# Patient Record
Sex: FEMALE | Race: BLACK OR AFRICAN AMERICAN | URBAN - METROPOLITAN AREA
[De-identification: names, ages, dates, MRNs, and addresses within clinical notes are randomized per-mention and may not be internally consistent; named-entity substitution may affect disease eponyms.]

---

## 2017-12-22 ENCOUNTER — HISTORICAL (OUTPATIENT)
Dept: EMERGENCY MEDICINE | Facility: HOSPITAL | Age: 27
End: 2017-12-22

## 2019-02-19 ENCOUNTER — HISTORICAL (OUTPATIENT)
Dept: LAB | Facility: HOSPITAL | Age: 29
End: 2019-02-19

## 2022-04-07 ENCOUNTER — HISTORICAL (OUTPATIENT)
Dept: ADMINISTRATIVE | Facility: HOSPITAL | Age: 32
End: 2022-04-07

## 2022-04-24 VITALS
WEIGHT: 293 LBS | HEIGHT: 66 IN | DIASTOLIC BLOOD PRESSURE: 83 MMHG | SYSTOLIC BLOOD PRESSURE: 128 MMHG | BODY MASS INDEX: 47.09 KG/M2

## 2022-04-29 NOTE — ED PROVIDER NOTES
Patient:   Mk Corley             MRN: [id number]            FIN: [id number]               Age:   27 years     Sex:  Female     :  1990   Associated Diagnoses:   Tooth ache   Author:   Ariel Pelaez MD      Basic Information   Time seen: Date & time 2017 08:50:00.   History source: Patient.   Arrival mode: Private vehicle.   History limitation: None.   Additional information: Chief Complaint from Nursing Triage Note : Chief Complaint   2017 8:58 CST      Chief Complaint           TOOTH PAIN UPPER LEFT WITH EAR PAIN  .      History of Present Illness   The patient presents with dental pain.  The onset was just prior to arrival.  The course/duration of symptoms is constant.  Type of injury: none.  The location where the incident occurred was at home.  Location: Left upper. The character of symptoms is pain and swelling.  The degree of pain is minimal.  The exacerbating factor is eating.  The relieving factor is none.  Risk factors consist of none.  Prior episodes: occasional.  Therapy today: none.  Associated symptoms: none.        Review of Systems   ENMT symptoms:  Mouth: Dental pain.             Additional review of systems information: All other systems reviewed and otherwise negative.      Health Status   Allergies:    Allergic Reactions (Selected)  No Known Allergies,    Allergies (1) Active Reaction  No Known Allergies None Documented  .      Past Medical/ Family/ Social History   Surgical history:    No active procedure history items have been selected or recorded..   Family history:    No family history items have been selected or recorded..   Social history: Not significant.      Physical Examination               Vital Signs   Vital Signs   2017 8:58 CST      Temperature Oral          37.2 DegC                             Temperature Oral (calculated)             98.96 DegF                             Peripheral Pulse Rate     95 bpm                              Respiratory Rate          14 br/min                             SpO2                      98 %                             Oxygen Therapy            Room air                             Systolic Blood Pressure   131 mmHg                             Diastolic Blood Pressure  85 mmHg  .   Measurements   12/22/2017 8:58 CST      Weight Dosing             157.4 kg                             Weight Measured           157.4 kg                             Weight Measured and Calculated in Lbs     347.00 lb                             Height/Length Dosing      167 cm                             Height/Length Measured    167 cm                             Body Mass Index Measured  56.44 kg/m2  .   Basic Oxygen Information   12/22/2017 8:58 CST      SpO2                      98 %                             Oxygen Therapy            Room air  .   General:  Alert, no acute distress.    Skin:  Warm, pink, intact, no rash.    Head:  Normocephalic, atraumatic.    Neck:  Supple, trachea midline, no tenderness, no JVD.    Eye:  Pupils are equal, round and reactive to light, extraocular movements are intact, normal conjunctiva.    Ears, nose, mouth and throat:  Tympanic membranes clear, oral mucosa moist, no pharyngeal erythema or exudate, Tooth: Left, upper, premolar, dental caries.    Cardiovascular:  Regular rate and rhythm, No murmur, Normal peripheral perfusion, No edema.    Respiratory:  Lungs are clear to auscultation, respirations are non-labored, breath sounds are equal, Symmetrical chest wall expansion.    Musculoskeletal:  Normal ROM, normal strength, no tenderness, no swelling, no deformity.    Chest wall   Gastrointestinal:  Soft, Nontender, Non distended, Normal bowel sounds.    Neurological:  Alert and oriented to person, place, time, and situation, No focal neurological deficit observed, CN II-XII intact.    Lymphatics   Psychiatric:  Cooperative, appropriate mood & affect.       Impression and Plan   Diagnosis    Tooth ache (ABK21-QS K08.89)   Plan   Condition: Improved, Stable.    Disposition: Medically cleared, Discharged: Time  12/22/2017 09:17:00, to home.    Prescriptions: Launch prescriptions   Pharmacy:  Toradol 10 mg oral tablet (Prescribe): 10 mg = 1 tab(s), Oral, QID, PRN PRN as needed for pain, not to exceed 40 mg/day and 5 days duration for all dose forms, # 12 tab(s), 0 Refill(s), Pharmacy: [facility] [id number]  penicillin V potassium 500 mg oral tablet (Prescribe): 500 mg = 1 tab(s), Oral, QID, X 10 day(s), # 40 tab(s), 0 Refill(s), Pharmacy: [facility] [id number].    Patient was given the following educational materials: Dental Pain, Dental Pain.    Follow up with: Clinic PCP; Follow up with primary care provider Call for followup appointment, In: as needed.    Counseled: Patient, Regarding diagnosis, Regarding diagnostic results, Regarding treatment plan, Regarding prescription, Patient indicated understanding of instructions.

## 2024-07-01 ENCOUNTER — OFFICE VISIT (OUTPATIENT)
Dept: INTERNAL MEDICINE | Facility: CLINIC | Age: 34
End: 2024-07-01
Payer: MEDICARE

## 2024-07-01 VITALS
TEMPERATURE: 98 F | DIASTOLIC BLOOD PRESSURE: 83 MMHG | WEIGHT: 293 LBS | BODY MASS INDEX: 47.09 KG/M2 | RESPIRATION RATE: 20 BRPM | HEIGHT: 66 IN | HEART RATE: 109 BPM | OXYGEN SATURATION: 99 % | SYSTOLIC BLOOD PRESSURE: 125 MMHG

## 2024-07-01 DIAGNOSIS — J45.909 ASTHMA, UNSPECIFIED ASTHMA SEVERITY, UNSPECIFIED WHETHER COMPLICATED, UNSPECIFIED WHETHER PERSISTENT: ICD-10-CM

## 2024-07-01 DIAGNOSIS — E66.01 CLASS 3 SEVERE OBESITY WITH BODY MASS INDEX (BMI) OF 50.0 TO 59.9 IN ADULT, UNSPECIFIED OBESITY TYPE, UNSPECIFIED WHETHER SERIOUS COMORBIDITY PRESENT: ICD-10-CM

## 2024-07-01 DIAGNOSIS — Q72.61: ICD-10-CM

## 2024-07-01 DIAGNOSIS — Q72.51: ICD-10-CM

## 2024-07-01 DIAGNOSIS — F41.1 GENERALIZED ANXIETY DISORDER: ICD-10-CM

## 2024-07-01 DIAGNOSIS — Z11.3 SCREEN FOR STD (SEXUALLY TRANSMITTED DISEASE): ICD-10-CM

## 2024-07-01 DIAGNOSIS — Z00.00 WELLNESS EXAMINATION: ICD-10-CM

## 2024-07-01 PROCEDURE — 99214 OFFICE O/P EST MOD 30 MIN: CPT | Mod: PBBFAC | Performed by: NURSE PRACTITIONER

## 2024-07-01 PROCEDURE — 99204 OFFICE O/P NEW MOD 45 MIN: CPT | Mod: S$PBB,,, | Performed by: NURSE PRACTITIONER

## 2024-07-01 PROCEDURE — 3074F SYST BP LT 130 MM HG: CPT | Mod: CPTII,,, | Performed by: NURSE PRACTITIONER

## 2024-07-01 PROCEDURE — 1159F MED LIST DOCD IN RCRD: CPT | Mod: CPTII,,, | Performed by: NURSE PRACTITIONER

## 2024-07-01 PROCEDURE — 3008F BODY MASS INDEX DOCD: CPT | Mod: CPTII,,, | Performed by: NURSE PRACTITIONER

## 2024-07-01 PROCEDURE — 1160F RVW MEDS BY RX/DR IN RCRD: CPT | Mod: CPTII,,, | Performed by: NURSE PRACTITIONER

## 2024-07-01 PROCEDURE — 3079F DIAST BP 80-89 MM HG: CPT | Mod: CPTII,,, | Performed by: NURSE PRACTITIONER

## 2024-07-01 NOTE — PROGRESS NOTES
"[facility]  [address]     Patient Name: Kirby Nycollette Paige   : 1990  MRN:[id number]     Chief Complaint     Chief Complaint   Patient presents with    [facility]        History of Present Illness     33 year old AAF, presents in clinic to establish PCP. Complains of fatigue, abd pain after eating and relieved with gas. Request for RX for a new R leg prosthesis. States the one she is using today is 4 years old.   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 Following Dr. Sorenson G[facility], due for routine pap smear. Denies abnormal paps.   LMP 2024-2024, cycles are irregular, and heavy and painful. Denies recent asthma exacerbations.  Denies chest pain, shortness of breath, cough, fever, headache, dizziness, weakness, abdominal pain, nausea, vomiting, diarrhea, constipation, dysuria, depression, anxiety.   Past PCP Dr. Humza Gruber in [facility]([place]          Review of Systems     Review of Systems   Constitutional:  Positive for fatigue.   HENT: Negative.     Eyes: Negative.    Respiratory: Negative.     Cardiovascular: Negative.    Gastrointestinal:  Positive for abdominal pain and reflux.   Endocrine: Negative.    Genitourinary: Negative.    Musculoskeletal: Negative.    Integumentary:  Negative.   Allergic/Immunologic: Negative.    Neurological: Negative.    Hematological: Negative.    Psychiatric/Behavioral: Negative.     All other systems reviewed and are negative.       Physical Examination     Visit Vitals  /83 (BP Location: Right forearm, Patient Position: Sitting, BP Method: Medium (Automatic))   Pulse 109   Temp 98 °F (36.7 °C) (Oral)   Resp 20   Ht 5' 6" (1.676 m)   Wt (!) 167.8 kg (370 lb)   LMP 2024 (Approximate)   SpO2 99%   BMI 59.72 kg/m²        BP Readings from Last 6 Encounters:   24 125/83   19 128/83   ]    Wt Readings from Last 6 Encounters:   24 (!) 167.8 " "kg (370 lb)   02/18/19 (!) 150.6 kg (331 lb 15.9 oz)   ]    BMI Readings from Last 3 Encounters:   07/01/24 59.72 kg/m²   02/18/19 53.35 kg/m²         Physical Exam  Vitals and nursing note reviewed.   Constitutional:       Appearance: Normal appearance. She is obese.   HENT:      Head: Normocephalic and atraumatic.      Right Ear: Tympanic membrane, ear canal and external ear normal.      Left Ear: Tympanic membrane, ear canal and external ear normal.      Nose: Nose normal.      Mouth/Throat:      Mouth: Mucous membranes are moist.      Pharynx: Oropharynx is clear.   Eyes:      Extraocular Movements: Extraocular movements intact.      Conjunctiva/sclera: Conjunctivae normal.      Pupils: Pupils are equal, round, and reactive to light.   Cardiovascular:      Rate and Rhythm: Normal rate and regular rhythm.      Pulses: Normal pulses.      Heart sounds: Normal heart sounds.   Pulmonary:      Effort: Pulmonary effort is normal.      Breath sounds: Normal breath sounds.   Abdominal:      General: Abdomen is flat. Bowel sounds are normal.      Palpations: Abdomen is soft.   Musculoskeletal:         General: Normal range of motion.      Cervical back: Normal range of motion and neck supple.      Comments: Right leg prosthesis   Skin:     General: Skin is warm and dry.      Capillary Refill: Capillary refill takes less than 2 seconds.   Neurological:      General: No focal deficit present.      Mental Status: She is alert and oriented to person, place, and time. Mental status is at baseline.   Psychiatric:         Mood and Affect: Mood normal.         Behavior: Behavior normal.         Thought Content: Thought content normal.         Judgment: Judgment normal.          Labs / Imaging     Chemistry:  No results found for: "NA", "K", "CHLORIDE", "BUN", "CREATININE", "EGFRNORACEVR", "GLUCOSE", "CALCIUM", "ALKPHOS", "LABPROT", "ALBUMIN", "BILIDIR", "IBILI", "AST", "ALT", "MG", "PHOS", "[id number]"     No results found " "for: "HGBA1C", "MICROALBCREA"     Hematology:  No results found for: "WBC", "RBC", "HGB", "HCT", "MCV", "MCH", "MCHC", "RDW", "PLT", "MPV", "GRAN", "LYMPH", "MONO", "EOS", "BASO", "EOSINOPHIL", "BASOPHIL"     Lipid Panel:  No results found for: "CHOL", "HDL", "LDL", "TRIG", "TOTALCHOLEST"     Urine:  No results found for: "COLORUA", "APPEARANCEUA", "SGUA", "PHUA", "PROTEINUA", "GLUCOSEUA", "KETONESUA", "BLOODUA", "NITRITESUA", "LEUKOCYTESUR", "RBCUA", "WBCUA", "BACTERIA", "SQEPUA", "HYALINECASTS", "CREATRANDUR", "PROTEINURINE", "UPROTCREA"       Assessment       ICD-10-CM ICD-9-CM   1. Congenital absence of lower leg only, right  Q72.51 755.35    Q72.61    2. Asthma, unspecified asthma severity, unspecified whether complicated, unspecified whether persistent  J45.909 493.90   3. Anxiety disorder, unspecified  F41.9 300.00   4. Wellness examination  Z00.00 V70.0   5. Screen for STD (sexually transmitted disease)  Z11.3 V74.5   6. Obesity, unspecified  E66.9 278.00        Plan     1. Congenital absence of lower leg only, right  RX for new right leg prosthesis, will send to [facility]]  - PROSTHESIS FOR HOME USE    2. Asthma, unspecified asthma severity, unspecified whether complicated, unspecified whether persistent  Last PFT- /Last CXR- ordered  Use inhalers as prescribed (rinse mouth after use of steroid inhalers).    Use long term inhalers daily and rescue inhaler as needed.    Avoid triggers (high humidity, strong odors, chemical fumes).    Report signs of upper respiratory infection as soon as possible for early treatment.    Practice/encouraged abdominal breathing.   Eat smaller, more frequent meals.   Flu shot recommended yearly.    Smoking cessation encouraged   - CBC Auto Differential; Future  - Comprehensive Metabolic Panel; Future  - X-Ray Chest PA And Lateral; Future  - Complete PFT with bronchodilator; Future    3. Anxiety disorder, unspecified     Practice deep breathing or abdominal breathing " exercises when anxiety occurs.  Exercise daily. Get sunlight daily.  Avoid caffeine, alcohol and stimulants.  Practice positive phrases and repeat throughout the day, yoga, lavender scents or Chamomile tea will help anxiety.  Set healthy boundaries, avoid people and conversations that increase stress.  Reports any symptoms of suicidal or homicidal ideations immediately, if clinic is closed go to nearest emergency room.   - TSH; Future    4. Wellness examination    - CBC Auto Differential; Future  - Comprehensive Metabolic Panel; Future  - Lipid Panel; Future  - TSH; Future  - Urinalysis; Future  - SYPHILIS ANTIBODY (WITH REFLEX RPR); Future  - HIV 1/2 Ag/Ab (4th Gen); Future  - Hepatitis Panel, Acute; Future  - Chlamydia/GC, PCR; Future  - HCG Qualitative Urine; Future    5. Screen for STD (sexually transmitted disease)    - Urinalysis; Future  - SYPHILIS ANTIBODY (WITH REFLEX RPR); Future  - HIV 1/2 Ag/Ab (4th Gen); Future  - Hepatitis Panel, Acute; Future  - Chlamydia/GC, PCR; Future  - HCG Qualitative Urine; Future    6. Obesity, unspecified  Goal BMI <30.  Exercise 5 times a week for 30 minutes per day.  Avoid soda, simple sugars, excessive rice, potatoes or bread. Limit fast foods and fried foods.  Choose complex carbs in moderation (example: green vegetables, beans, oatmeal). Eat plenty of fresh fruits and vegetables with lean meats daily.  Do not skip meals. Eat a balanced portion size.  Avoid fad diets. Consider permanent healthy life style changes.     - Lipid Panel; Future  - TSH; Future        Orders Placed This Encounter   Procedures    PROSTHESIS FOR HOME USE    Chlamydia/GC, PCR    X-Ray Chest PA And Lateral    CBC Auto Differential    Comprehensive Metabolic Panel    Lipid Panel    TSH    Urinalysis    SYPHILIS ANTIBODY (WITH REFLEX RPR)    HIV 1/2 Ag/Ab (4th Gen)    Hepatitis Panel, Acute    HCG Qualitative Urine    Complete PFT with bronchodilator         Future Appointments   Date Time Provider  Department Center   7/18/2024 11:15 AM [facility] XR1 [facility]    7/18/2024 12:00 PM [facility]   8/20/2024  9:40 AM [facility]        Follow up in about 4 weeks (around 7/29/2024) for lab review.    Labs thoroughly reviewed with patient. Medication refills addressed today.  RTC prn and 4 wks, with labs 1 week prior to the apt.  COVID 19 precautions given to patient.  Patient voices understanding of all discharge instructions.      [address]

## 2024-07-04 ENCOUNTER — PATIENT MESSAGE (OUTPATIENT)
Dept: INTERNAL MEDICINE | Facility: CLINIC | Age: 34
End: 2024-07-04
Payer: MEDICARE

## 2024-07-07 ENCOUNTER — TELEPHONE (OUTPATIENT)
Dept: INTERNAL MEDICINE | Facility: CLINIC | Age: 34
End: 2024-07-07
Payer: MEDICARE

## 2024-07-08 NOTE — TELEPHONE ENCOUNTER
Please fax printed order for right leg prosthesis to [facility] in [place] ([place]). Contact the [facility] to verify if any other information needs to be completed in order for pt to be fitted and  medical supplies. Thanks

## 2024-08-07 ENCOUNTER — PATIENT MESSAGE (OUTPATIENT)
Dept: INTERNAL MEDICINE | Facility: CLINIC | Age: 34
End: 2024-08-07
Payer: MEDICARE

## 2024-08-19 ENCOUNTER — TELEPHONE (OUTPATIENT)
Dept: INTERNAL MEDICINE | Facility: CLINIC | Age: 34
End: 2024-08-19
Payer: MEDICARE

## 2025-07-25 ENCOUNTER — TELEPHONE (OUTPATIENT)
Dept: INTERNAL MEDICINE | Facility: CLINIC | Age: 35
End: 2025-07-25
Payer: MEDICARE

## 2025-07-25 NOTE — TELEPHONE ENCOUNTER
Pt has upcoming appt for lab review . Noted no lab orders placed . Please place lab orders to complete prior to appt on 9/29/25.

## 2025-07-25 NOTE — TELEPHONE ENCOUNTER
----- Message from Salomón sent at 7/24/2025  2:02 PM CDT -----  Regarding: WELLNESS LABS  Hello,  Patient's visit on 09/29/2025 has been changed to an AWV. Her LOV was 07/01/2024. Please order wellness labs.    Thank you.  Uma

## 2025-07-28 ENCOUNTER — TELEPHONE (OUTPATIENT)
Dept: INTERNAL MEDICINE | Facility: CLINIC | Age: 35
End: 2025-07-28
Payer: MEDICARE

## 2025-07-28 DIAGNOSIS — E66.813 CLASS 3 SEVERE OBESITY WITH BODY MASS INDEX (BMI) OF 50.0 TO 59.9 IN ADULT, UNSPECIFIED OBESITY TYPE, UNSPECIFIED WHETHER SERIOUS COMORBIDITY PRESENT: ICD-10-CM

## 2025-07-28 DIAGNOSIS — Z00.00 WELLNESS EXAMINATION: Primary | ICD-10-CM

## 2025-07-28 DIAGNOSIS — Z11.3 SCREEN FOR STD (SEXUALLY TRANSMITTED DISEASE): ICD-10-CM

## 2025-07-28 NOTE — TELEPHONE ENCOUNTER
----- Message from Nurse Jolly sent at 7/25/2025  8:34 AM CDT -----  Regarding: FW: WELLNESS LABS    ----- Message -----  From: Salomón Dominguez  Sent: 7/24/2025   2:04 PM CDT  To: [facility] Staff  Subject: WELLNESS LABS                                    Hello,  Patient's visit on 09/29/2025 has been changed to an AWV. Her LOV was 07/01/2024. Please order wellness labs.    Thank you.  Uma